# Patient Record
Sex: FEMALE | Race: BLACK OR AFRICAN AMERICAN
[De-identification: names, ages, dates, MRNs, and addresses within clinical notes are randomized per-mention and may not be internally consistent; named-entity substitution may affect disease eponyms.]

---

## 2019-01-18 ENCOUNTER — HOSPITAL ENCOUNTER (EMERGENCY)
Dept: HOSPITAL 76 - ED | Age: 34
Discharge: HOME | End: 2019-01-18
Payer: COMMERCIAL

## 2019-01-18 VITALS — SYSTOLIC BLOOD PRESSURE: 104 MMHG | DIASTOLIC BLOOD PRESSURE: 71 MMHG

## 2019-01-18 DIAGNOSIS — H00.14: Primary | ICD-10-CM

## 2019-01-18 PROCEDURE — 99283 EMERGENCY DEPT VISIT LOW MDM: CPT

## 2019-01-18 NOTE — ED PHYSICIAN DOCUMENTATION
PD HPI OPHTHO





- Stated complaint


Stated Complaint: L EYE IRRITATION/WHITE SPOTS





- Chief complaint


Chief Complaint: Heent





- History obtained from


History obtained from: Patient





- History of Present Illness


Timing - onset: How many days ago (2)


Timing - duration: Days (2)


Timing - details: Still present


Location: Left


Quality / character: Burning


Associated symptoms: Swelling, Tearing


Similar symptoms before: Has not had sx before





- Additional information


Additional information: 


The patient is a 33-year-old female who presents with discomfort in her left I. 

She noticed a stye on the left upper eyelid 2 days ago.  She now describes it as

a burning pain that "feels like sand in it."  She denies any visual change.  She

denies headache, nausea or vomiting.  She has no history of similar symptoms in 

the past.








Review of Systems


Constitutional: denies: Fever


Eyes: reports: Irritation.  denies: Decreased vision, Photophobia


Nose: denies: Congestion


Throat: denies: Sore throat


Respiratory: denies: Cough


GI: denies: Nausea, Vomiting


Neurologic: denies: Headache





PD PAST MEDICAL HISTORY





- Past Medical History


Cardiovascular: None


Endocrine/Autoimmune: None





- Present Medications


Home Medications: 


                                Ambulatory Orders











 Medication  Instructions  Recorded  Confirmed


 


Erythromycin Base [Erythromycin 0.5 inch LEFTEYE QID #3.5 oint...g. 01/18/19 





Ophthalmic Ointment]   














- Allergies


Allergies/Adverse Reactions: 


                                    Allergies











Allergy/AdvReac Type Severity Reaction Status Date / Time


 


No Known Drug Allergies Allergy   Verified 01/18/19 14:56














PD ED PE NORMAL





- Vitals


Vital signs reviewed: Yes (Borderline hypertension initially.)





- General


General: Alert and oriented X 3, Well developed/nourished





- HEENT


HEENT: Atraumatic, PERRL, EOMI, Pharynx benign, Other (There is a stye noted on 

the margin of the left upper eyelid.  There is no conjunctival erythema or 

purulence detected.  Pupils are equal round reactive to light.)





- Neck


Neck: No adenopathy





- Respiratory


Respiratory: No respiratory distress





- Derm


Derm: No rash





- Neuro


Neuro: Alert and oriented X 3, Normal speech





Results





- Vitals


Vitals: 


                                     Oxygen











O2 Source                      Room air

















PD MEDICAL DECISION MAKING





- ED course


Complexity details: considered differential, d/w patient


ED course: 


The patient's presentation is most consistent with a stye of the left upper 

eyelid.  There is no evidence to suggest a conjunctival infection, glaucoma, or 

uveitis.  Treatment in the emergency department included application of 

erythromycin ophthalmic ointment in the left eye.  The remainder of the tube was

dispensed for use as an outpatient.  I discussed with her the expected course of

illness, outpatient treatment and follow-up, as well as potentially worrisome 

signs or symptoms that should prompt reevaluation in the emergency department.








Departure





- Departure


Disposition: 01 Home, Self Care


Clinical Impression: 


Hordeolum externum (stye)


Qualifiers:


 Laterality: left Eyelid: upper Qualified Code(s): H00.014 - Hordeolum externum 

left upper eyelid





Condition: Stable


Instructions:  ED Hordeolum


Follow-Up: 


ANAMARIA Farah [Provider Group]


Prescriptions: 


Erythromycin Base [Erythromycin Ophthalmic Ointment] 0.5 inch LEFTEYE QID #3.5 

oint...g.


Comments: 


Apply erythromycin ophthalmic ointment to your left eye 4 times daily for the 

next 3-5 days.


Apply hot soaks to the eyelid several times daily.


Follow-up with your primary physician within 2 weeks if not completely resolved.


Return to the emerge department if you develop increasing swelling of your 

eyelid, pain in your eye, or otherwise worsening symptoms.


Discharge Date/Time: 01/18/19 15:40